# Patient Record
Sex: MALE | Race: ASIAN
[De-identification: names, ages, dates, MRNs, and addresses within clinical notes are randomized per-mention and may not be internally consistent; named-entity substitution may affect disease eponyms.]

---

## 2019-10-29 ENCOUNTER — HOSPITAL ENCOUNTER (EMERGENCY)
Dept: HOSPITAL 74 - JER | Age: 44
Discharge: HOME | End: 2019-10-29
Payer: COMMERCIAL

## 2019-10-29 VITALS — HEART RATE: 92 BPM | DIASTOLIC BLOOD PRESSURE: 81 MMHG | SYSTOLIC BLOOD PRESSURE: 111 MMHG

## 2019-10-29 VITALS — BODY MASS INDEX: 24.3 KG/M2

## 2019-10-29 VITALS — TEMPERATURE: 98.2 F

## 2019-10-29 DIAGNOSIS — S09.8XXA: Primary | ICD-10-CM

## 2019-10-29 DIAGNOSIS — I10: ICD-10-CM
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
## 2019-10-29 DIAGNOSIS — F17.210: ICD-10-CM
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
## 2019-10-29 DIAGNOSIS — R51: ICD-10-CM

## 2019-10-29 DIAGNOSIS — M54.2: ICD-10-CM
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
## 2019-10-29 PROCEDURE — 3E0233Z INTRODUCTION OF ANTI-INFLAMMATORY INTO MUSCLE, PERCUTANEOUS APPROACH: ICD-10-PCS | Performed by: EMERGENCY MEDICINE
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
## 2023-05-24 ENCOUNTER — HOSPITAL ENCOUNTER (EMERGENCY)
Dept: HOSPITAL 74 - JER | Age: 48
Discharge: HOME | End: 2023-05-24
Payer: COMMERCIAL

## 2023-05-24 VITALS — TEMPERATURE: 98.9 F | DIASTOLIC BLOOD PRESSURE: 85 MMHG | HEART RATE: 85 BPM | SYSTOLIC BLOOD PRESSURE: 126 MMHG

## 2023-05-24 VITALS — BODY MASS INDEX: 24.7 KG/M2

## 2023-05-24 VITALS — RESPIRATION RATE: 16 BRPM

## 2023-05-24 DIAGNOSIS — R00.0: ICD-10-CM

## 2023-05-24 DIAGNOSIS — K62.5: Primary | ICD-10-CM

## 2023-05-24 DIAGNOSIS — R11.0: ICD-10-CM

## 2023-05-24 DIAGNOSIS — Z20.822: ICD-10-CM

## 2023-05-24 LAB
ALBUMIN SERPL-MCNC: 3.6 G/DL (ref 3.4–5)
ALP SERPL-CCNC: 58 U/L (ref 45–117)
ALT SERPL-CCNC: 65 U/L (ref 13–61)
ANION GAP SERPL CALC-SCNC: 5 MMOL/L (ref 8–16)
APTT BLD: 34.6 SECONDS (ref 25.2–36.5)
AST SERPL-CCNC: 42 U/L (ref 15–37)
BASOPHILS # BLD: 0.6 % (ref 0–2)
BASOPHILS # BLD: 0.8 % (ref 0–2)
BILIRUB SERPL-MCNC: 0.4 MG/DL (ref 0.2–1)
BUN SERPL-MCNC: 8.2 MG/DL (ref 7–18)
CALCIUM SERPL-MCNC: 9.8 MG/DL (ref 8.5–10.1)
CHLORIDE SERPL-SCNC: 105 MMOL/L (ref 98–107)
CO2 SERPL-SCNC: 29 MMOL/L (ref 21–32)
CREAT SERPL-MCNC: 0.7 MG/DL (ref 0.55–1.3)
DEPRECATED RDW RBC AUTO: 13.3 % (ref 11.9–15.9)
DEPRECATED RDW RBC AUTO: 13.5 % (ref 11.9–15.9)
EOSINOPHIL # BLD: 1.3 % (ref 0–4.5)
EOSINOPHIL # BLD: 2.6 % (ref 0–4.5)
GLUCOSE SERPL-MCNC: 153 MG/DL (ref 74–106)
HCT VFR BLD CALC: 39.5 % (ref 35.4–49)
HCT VFR BLD CALC: 39.6 % (ref 35.4–49)
HGB BLD-MCNC: 13.6 GM/DL (ref 11.7–16.9)
HGB BLD-MCNC: 13.7 GM/DL (ref 11.7–16.9)
INR BLD: 1.24 (ref 0.83–1.09)
LYMPHOCYTES # BLD: 32.4 % (ref 8–40)
LYMPHOCYTES # BLD: 45.2 % (ref 8–40)
MCH RBC QN AUTO: 32.3 PG (ref 25.7–33.7)
MCH RBC QN AUTO: 32.5 PG (ref 25.7–33.7)
MCHC RBC AUTO-ENTMCNC: 34.3 G/DL (ref 32–35.9)
MCHC RBC AUTO-ENTMCNC: 34.7 G/DL (ref 32–35.9)
MCV RBC: 93.8 FL (ref 80–96)
MCV RBC: 94.3 FL (ref 80–96)
MONOCYTES # BLD AUTO: 6.4 % (ref 3.8–10.2)
MONOCYTES # BLD AUTO: 8.9 % (ref 3.8–10.2)
NEUTROPHILS # BLD: 42.7 % (ref 42.8–82.8)
NEUTROPHILS # BLD: 59.1 % (ref 42.8–82.8)
PLATELET # BLD AUTO: 279 10^3/UL (ref 134–434)
PLATELET # BLD AUTO: 299 10^3/UL (ref 134–434)
PMV BLD: 7.6 FL (ref 7.5–11.1)
PMV BLD: 8.2 FL (ref 7.5–11.1)
POTASSIUM SERPLBLD-SCNC: 4.9 MMOL/L (ref 3.5–5.1)
PROT SERPL-MCNC: 7.5 G/DL (ref 6.4–8.2)
PT PNL PPP: 14.4 SEC (ref 9.7–13)
RBC # BLD AUTO: 4.2 M/MM3 (ref 4–5.6)
RBC # BLD AUTO: 4.21 M/MM3 (ref 4–5.6)
SODIUM SERPL-SCNC: 140 MMOL/L (ref 136–145)
WBC # BLD AUTO: 10 K/MM3 (ref 4–10)
WBC # BLD AUTO: 7.6 K/MM3 (ref 4–10)

## 2023-05-24 PROCEDURE — U0003 INFECTIOUS AGENT DETECTION BY NUCLEIC ACID (DNA OR RNA); SEVERE ACUTE RESPIRATORY SYNDROME CORONAVIRUS 2 (SARS-COV-2) (CORONAVIRUS DISEASE [COVID-19]), AMPLIFIED PROBE TECHNIQUE, MAKING USE OF HIGH THROUGHPUT TECHNOLOGIES AS DESCRIBED BY CMS-2020-01-R: HCPCS

## 2023-05-24 PROCEDURE — U0005 INFEC AGEN DETEC AMPLI PROBE: HCPCS
